# Patient Record
Sex: MALE | Race: WHITE | NOT HISPANIC OR LATINO | Employment: OTHER | URBAN - METROPOLITAN AREA
[De-identification: names, ages, dates, MRNs, and addresses within clinical notes are randomized per-mention and may not be internally consistent; named-entity substitution may affect disease eponyms.]

---

## 2019-03-06 ENCOUNTER — TRANSFERRED RECORDS (OUTPATIENT)
Dept: HEALTH INFORMATION MANAGEMENT | Facility: CLINIC | Age: 69
End: 2019-03-06

## 2021-01-05 ENCOUNTER — TRANSFERRED RECORDS (OUTPATIENT)
Dept: HEALTH INFORMATION MANAGEMENT | Facility: CLINIC | Age: 71
End: 2021-01-05

## 2021-01-07 ENCOUNTER — TRANSFERRED RECORDS (OUTPATIENT)
Dept: HEALTH INFORMATION MANAGEMENT | Facility: CLINIC | Age: 71
End: 2021-01-07

## 2021-09-27 ENCOUNTER — PRE VISIT (OUTPATIENT)
Dept: OTHER | Age: 71
End: 2021-09-27

## 2021-09-27 ENCOUNTER — TRANSCRIBE ORDERS (OUTPATIENT)
Dept: OTHER | Age: 71
End: 2021-09-27

## 2021-09-27 DIAGNOSIS — C61 PROSTATE CANCER (H): Primary | ICD-10-CM

## 2021-09-30 ENCOUNTER — PATIENT OUTREACH (OUTPATIENT)
Dept: ONCOLOGY | Facility: CLINIC | Age: 71
End: 2021-09-30

## 2021-10-01 NOTE — PROGRESS NOTES
New Patient Oncology Nurse Navigator Note     Transfer patient of Dr. Steve     Date Referral Received: 9/27/21     Evaluation for : prostate cancer     Jamaal returned my call today to discuss his self referral to see Dr. Steve.  He was previously seen by him at [facility].  I have placed a hold for 11/8/21, [id number].  He is planning to visit [place] to see him in person as he lives in [place].  I let Jamaal know our scheduling team will be reaching out to finalize his appointment.

## 2021-10-17 ENCOUNTER — HEALTH MAINTENANCE LETTER (OUTPATIENT)
Age: 71
End: 2021-10-17

## 2021-11-02 ENCOUNTER — MYC MEDICAL ADVICE (OUTPATIENT)
Dept: ONCOLOGY | Facility: CLINIC | Age: 71
End: 2021-11-02

## 2021-11-03 NOTE — TELEPHONE ENCOUNTER
Meet with GP who thought it was an inner ear problem and was given meclizine. Has picked up the medication and will take the medication to see if it helps with his dizzy spells. Has appointment scheduled on Monday 11/8 to see Dr Steve at [facility].

## 2021-11-08 ENCOUNTER — ONCOLOGY VISIT (OUTPATIENT)
Dept: ONCOLOGY | Facility: CLINIC | Age: 71
End: 2021-11-08
Attending: INTERNAL MEDICINE
Payer: MEDICARE

## 2021-11-08 ENCOUNTER — PATIENT OUTREACH (OUTPATIENT)
Dept: ONCOLOGY | Facility: CLINIC | Age: 71
End: 2021-11-08
Payer: MEDICARE

## 2021-11-08 DIAGNOSIS — C61 MALIGNANT NEOPLASM OF PROSTATE (H): Primary | ICD-10-CM

## 2021-11-08 PROCEDURE — G0463 HOSPITAL OUTPT CLINIC VISIT: HCPCS

## 2021-11-08 PROCEDURE — 99215 OFFICE O/P EST HI 40 MIN: CPT | Performed by: INTERNAL MEDICINE

## 2021-11-08 PROCEDURE — 99417 PROLNG OP E/M EACH 15 MIN: CPT | Performed by: INTERNAL MEDICINE

## 2021-11-08 RX ORDER — PREDNISONE 5 MG/1
1 TABLET ORAL
COMMUNITY
Start: 2021-01-05 | End: 2022-06-22

## 2021-11-08 RX ORDER — ABIRATERONE ACETATE 250 MG/1
1000 TABLET ORAL
COMMUNITY
Start: 2021-01-15 | End: 2022-06-22

## 2021-11-08 RX ORDER — LEUPROLIDE ACETATE 22.5 MG
KIT INTRAMUSCULAR
COMMUNITY
Start: 2018-03-15

## 2021-11-08 ASSESSMENT — PAIN SCALES - GENERAL: PAINLEVEL: NO PAIN (0)

## 2021-11-08 ASSESSMENT — MIFFLIN-ST. JEOR: SCORE: 1772.79

## 2021-11-08 NOTE — NURSING NOTE
"Oncology Rooming Note    November 8, 2021 11:09 AM   Jamaal Hernandez is a 71 year old male who presents for:    Chief Complaint   Patient presents with     Oncology Clinic Visit     NEW PROSTATE CANCER      Initial Vitals: BP (!) 170/81   Pulse 64   Temp 97.7  F (36.5  C) (Oral)   Ht 1.753 m (5' 9\")   Wt 102.2 kg (225 lb 6.4 oz)   SpO2 97%   BMI 33.29 kg/m   Estimated body mass index is 33.29 kg/m  as calculated from the following:    Height as of this encounter: 1.753 m (5' 9\").    Weight as of this encounter: 102.2 kg (225 lb 6.4 oz). Body surface area is 2.23 meters squared.  No Pain (0) Comment: Data Unavailable   No LMP for male patient.  Allergies reviewed: Yes  Medications reviewed: Yes    Medications: Medication refills not needed today.  Pharmacy name entered into [employer]:    [id number] - [place], [place] - [address]  [facility] - [facility], [place] - [address]    Clinical concerns: none       Gale Boo CMA              "

## 2021-11-08 NOTE — PROGRESS NOTES
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    History of Present Illness:  Dear Dr Malik Garza,    Thank you for referring your patient for a Second Opinion Consultation at the [facility].  A brief overview of his oncological history as well as my recommendations follow below.    Mr. Jamaal Hernandez is a 71-year-old man who was diagnosed with metastatic prostate cancer just over 3 years ago in 3/2018.  His PSA level at the time of diagnosis was 7.1 ng/mL.  His imaging assessments showed pelvic and retroperitoneal lymphadenopathy without evidence of overt bone metastases.  A prostatic biopsy revealed [place] 4+5=9 adenocarcinoma.  He was treated with androgen deprivation therapy plus primary external-beam radiotherapy targeting the prostate gland.  He did well with androgen deprivation, but subsequently developed metastatic castration-resistant prostate cancer in 1/2021.  At that time, he had a left supraclavicular lymph node that was enlarging despite a relatively low PSA level.  He underwent a biopsy of that malignant lymph node, which confirmed prostatic adenocarcinoma without neuroendocrine or small cell features.  Somatic genomic analysis from that biopsy revealed combined mutations in TP53 and PTEN.  He was then started on abiraterone plus prednisone in 1/2021.  Although his PSA level initially dropped from 0.7 ng/mL down to 0.2 ng/mL, his PSA level has been slowly rising over the past several months.  His most recent PSA level on 10/26/2021 was 0.6 ng/mL (and testosterone was <3 ng/dL).  The patient presents today to discuss treatment options moving forward.  He is accompanied by his wife.  He wishes to be as aggressive as possible with the management of his metastatic castration-resistant prostate cancer.    Review of Systems:  The patient  reports feeling generally well.  However, he has noticed some dizziness and vertigo in the last 7-10 days.  This is partially relieved by taking meclizine.  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  Other than that, he does not report any other significant signs or symptoms at this time.  He has not had any weight loss or weight gain.  He does not have any bone pain.  A comprehensive 14-system review of symptoms is otherwise generally within normal limits.  His ECOG score is 0.  His pain score is 0/10.    Past Medical History:  Prostate cancer, metastatic, castration-resistant  Hypercholesterolemia  Vitamin D deficiency    Medications:  Leuprolide 22.5 mg injection every 3 months  Abiraterone 1000 mg  Prednisone 5 mg twice daily  Tamsulosin 0.4 mg  Melatonin 10 mg  Calcium and vitamin D  Multivitamin tablet  Omega-3 fatty acids  Pomegranate extract    Allergies:  No known drug allergies    Social History:  The patient lives in [place], [place].  He is .  He is now retired.  He does not smoke cigarettes or drink alcohol on a regular basis.    Family History:  The patient's brother was diagnosed with prostate cancer.  His maternal uncle had lung cancer.  The patient had germline genetic testing performed, and he was not found to have any pathogenic germline DNA repair gene alterations.    Examination:  Mr. Hernandez is a 71-year-old man who appears younger than his age.  His vital signs are unremarkable.  His HEENT examination is within normal limits.  There is no palpable lymphadenopathy.  The cardiac, pulmonary, and gastrointestinal examinations are generally within normal limits.  The neuromuscular evaluation is intact.  The extremities do not demonstrate pitting edema.  The skin evaluation is unremarkable.    Laboratories:  PSA (12/15/2020) - 0.7 ng/mL  --->  Abiraterone started in 1/2021  PSA (2/16/2021)  "- 0.4 ng/mL  PSA (4/7/2021) - 0.2 ng/mL  PSA (6/30/2021) - 0.2 ng/mL  PSA (7/27/2021) - 0.2 ng/mL  PSA (9/22/2021) - 0.4 ng/mL  PSA (10/26/2021) - 0.6 ng/mL    Imaging:  The patient underwent a CT scan of the chest, abdomen and pelvis with intravenous contrast on 11/9/2021. This showed an enlarging left supraclavicular lymph node measuring 4.0 x 2.4 cm, as well as a growing left retrocaval lymph node measuring 2.6 x 1.8 cm.  There was no evidence of visceral involvement.  He also underwent a nuclear-medicine Bone scan on 11/9/2021, which was negative for osteosclerotic metastases.  However, he did have an incidental nondisplaced fracture of the right fourth rib.  Upon further questioning, the patient did report trauma to this area which occurred approximately 2 months ago.      ASSESSMENT AND PLAN:  Mr. Hernandez is a 71-year-old man with [place] 4+5=9 metastatic (node-predominant) castration-resistant prostate cancer with combined TP53 and PTEN mutations, who is developing serologic and radiographic progression despite abiraterone plus prednisone.  His ECOG score is 0.  His pain score is 0/10.    His recent imaging assessments demonstrate a node-tropic disease without visceral or osseous metastases.  However, two of these lymph node areas are growing and are beginning to become quite bulky.  Taken together with his rising PSA level (although it is still low), there is now clear evidence of acquired resistance to abiraterone and prednisone.    The standard treatment for this patient would be to consider a taxane chemotherapy agent, such as docetaxel or cabazitaxel.  Due to the combined alterations in TP53 and PTEN, together with the fact that his disease is progressing despite a relatively low PSA level, this points to the notion that he might be developing \"lineage plasticity\" of his cancer with an ongoing transformation towards a more dedifferentiated phenotype.  We discussed the risks and benefits of taxane " chemotherapy in detail with the patient and his wife today.    Alternatively, the patient may wish to consider clinical trial participation using the paradoxical bipolar androgen therapy (BAT) strategy.  He appears to be eligible for two clinical trials at [facility] using BAT.  The first is the fourth cohort (cohort D) of the RESTORE trial, although it is unclear if that trial is still open.  The second study is the randomized STEP-UP trial of BAT alternating with enzalutamide versus enzalutamide monotherapy.  Finally, standard enzalutamide treatment would also be an option, although I would not anticipate a great benefit from a second consecutive antiandrogen in this patient.    After discussing all of these options with the patient and his wife, he was not eager to begin taxane-based chemotherapy quite yet.  He was also reluctant to consider enzalutamide, given that its activity is modest immediately following abiraterone.  Thus, he was strongly leaning towards the BAT approach.  However, he was concerned about potentially being randomized to a control arm and not receiving BAT after all.  I will discuss his preference with Dr. Garza, in order to determine whether it might be possible to treat him with BAT outside of a clinical trial.  The patient is very amenable to this option, if it is possible.    The patient and his wife were given the opportunity to ask multiple questions today, all of which were answered to their satisfaction.  A total of 80 minutes were spent on this patient on the day of the encounter, of which more than 50% of this time was used for counseling and coordination of care.    Darnell Steve M.D.

## 2021-11-08 NOTE — LETTER
11/8/2021         RE: Jamaal Hernandez  [address]  [place] [address]        Dear Colleague,    Thank you for referring your patient, Jamaal Hernandez, to the [facility]. Please see a copy of my visit note below.      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    History of Present Illness:  Dear Dr Malik Garza,    Thank you for referring your patient for a Second Opinion Consultation at the [facility].  A brief overview of his oncological history as well as my recommendations follow below.    Mr. Jamaal Hernandez is a 71-year-old man who was diagnosed with metastatic prostate cancer just over 3 years ago in 3/2018.  His PSA level at the time of diagnosis was 7.1 ng/mL.  His imaging assessments showed pelvic and retroperitoneal lymphadenopathy without evidence of overt bone metastases.  A prostatic biopsy revealed Rito 4+5=9 adenocarcinoma.  He was treated with androgen deprivation therapy plus primary external-beam radiotherapy targeting the prostate gland.  He did well with androgen deprivation, but subsequently developed metastatic castration-resistant prostate cancer in 1/2021.  At that time, he had a left supraclavicular lymph node that was enlarging despite a relatively low PSA level.  He underwent a biopsy of that malignant lymph node, which confirmed prostatic adenocarcinoma without neuroendocrine or small cell features.  Somatic genomic analysis from that biopsy revealed combined mutations in TP53 and PTEN.  He was then started on abiraterone plus prednisone in 1/2021.  Although his PSA level initially dropped from 0.7 ng/mL down to 0.2 ng/mL, his PSA level has been slowly rising over the past several months.  His most recent PSA level on 10/26/2021 was 0.6 ng/mL (and testosterone was <3 ng/dL).  The  patient presents today to discuss treatment options moving forward.  He is accompanied by his wife.  He wishes to be as aggressive as possible with the management of his metastatic castration-resistant prostate cancer.    Review of Systems:  The patient reports feeling generally well.  However, he has noticed some dizziness and vertigo in the last 7-10 days.  This is partially relieved by taking meclizine.  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  Other than that, he does not report any other significant signs or symptoms at this time.  He has not had any weight loss or weight gain.  He does not have any bone pain.  A comprehensive 14-system review of symptoms is otherwise generally within normal limits.  His ECOG score is 0.  His pain score is 0/10.    Past Medical History:  Prostate cancer, metastatic, castration-resistant  Hypercholesterolemia  Vitamin D deficiency    Medications:  Leuprolide 22.5 mg injection every 3 months  Abiraterone 1000 mg  Prednisone 5 mg twice daily  Tamsulosin 0.4 mg  Melatonin 10 mg  Calcium and vitamin D  Multivitamin tablet  Omega-3 fatty acids  Pomegranate extract    Allergies:  No known drug allergies    Social History:  The patient lives in [place], [place].  He is .  He is now retired.  He does not smoke cigarettes or drink alcohol on a regular basis.    Family History:  The patient's brother was diagnosed with prostate cancer.  His maternal uncle had lung cancer.  The patient had germline genetic testing performed, and he was not found to have any pathogenic germline DNA repair gene alterations.    Examination:  Mr. Hernandez is a 71-year-old man who appears younger than his age.  His vital signs are unremarkable.  His HEENT examination is within normal limits.  There is no palpable lymphadenopathy.  The cardiac, pulmonary, and gastrointestinal examinations are generally  within normal limits.  The neuromuscular evaluation is intact.  The extremities do not demonstrate pitting edema.  The skin evaluation is unremarkable.    Laboratories:  PSA (12/15/2020) - 0.7 ng/mL  --->  Abiraterone started in 1/2021  PSA (2/16/2021) - 0.4 ng/mL  PSA (4/7/2021) - 0.2 ng/mL  PSA (6/30/2021) - 0.2 ng/mL  PSA (7/27/2021) - 0.2 ng/mL  PSA (9/22/2021) - 0.4 ng/mL  PSA (10/26/2021) - 0.6 ng/mL    Imaging:  The patient underwent a CT scan of the chest, abdomen and pelvis with intravenous contrast on 11/9/2021. This showed an enlarging left supraclavicular lymph node measuring 4.0 x 2.4 cm, as well as a growing left retrocaval lymph node measuring 2.6 x 1.8 cm.  There was no evidence of visceral involvement.  He also underwent a nuclear-medicine Bone scan on 11/9/2021, which was negative for osteosclerotic metastases.  However, he did have an incidental nondisplaced fracture of the right fourth rib.  Upon further questioning, the patient did report trauma to this area which occurred approximately 2 months ago.      ASSESSMENT AND PLAN:  Mr. Hernandez is a 71-year-old man with [place] 4+5=9 metastatic (node-predominant) castration-resistant prostate cancer with combined TP53 and PTEN mutations, who is developing serologic and radiographic progression despite abiraterone plus prednisone.  His ECOG score is 0.  His pain score is 0/10.    His recent imaging assessments demonstrate a node-tropic disease without visceral or osseous metastases.  However, two of these lymph node areas are growing and are beginning to become quite bulky.  Taken together with his rising PSA level (although it is still low), there is now clear evidence of acquired resistance to abiraterone and prednisone.    The standard treatment for this patient would be to consider a taxane chemotherapy agent, such as docetaxel or cabazitaxel.  Due to the combined alterations in TP53 and PTEN, together with the fact that his disease is progressing  "despite a relatively low PSA level, this points to the notion that he might be developing \"lineage plasticity\" of his cancer with an ongoing transformation towards a more dedifferentiated phenotype.  We discussed the risks and benefits of taxane chemotherapy in detail with the patient and his wife today.    Alternatively, the patient may wish to consider clinical trial participation using the paradoxical bipolar androgen therapy (BAT) strategy.  He appears to be eligible for two clinical trials at [facility] using BAT.  The first is the fourth cohort (cohort D) of the RESTORE trial, although it is unclear if that trial is still open.  The second study is the randomized STEP-UP trial of BAT alternating with enzalutamide versus enzalutamide monotherapy.  Finally, standard enzalutamide treatment would also be an option, although I would not anticipate a great benefit from a second consecutive antiandrogen in this patient.    After discussing all of these options with the patient and his wife, he was not eager to begin taxane-based chemotherapy quite yet.  He was also reluctant to consider enzalutamide, given that its activity is modest immediately following abiraterone.  Thus, he was strongly leaning towards the BAT approach.  However, he was concerned about potentially being randomized to a control arm and not receiving BAT after all.  I will discuss his preference with Dr. Garza, in order to determine whether it might be possible to treat him with BAT outside of a clinical trial.  The patient is very amenable to this option, if it is possible.    The patient and his wife were given the opportunity to ask multiple questions today, all of which were answered to their satisfaction.  A total of 80 minutes were spent on this patient on the day of the encounter, of which more than 50% of this time was used for counseling and coordination of care.    Darnell Steve M.D.        Again, thank you for allowing " me to participate in the care of your patient.        Sincerely,        Darnell Steve MD

## 2021-11-08 NOTE — LETTER
Date:December 13, 2021      Provider requested that no letter be sent. Do not send.       [facility]

## 2021-11-09 ENCOUNTER — HOSPITAL ENCOUNTER (OUTPATIENT)
Dept: NUCLEAR MEDICINE | Facility: CLINIC | Age: 71
Setting detail: NUCLEAR MEDICINE
End: 2021-11-09
Attending: INTERNAL MEDICINE
Payer: MEDICARE

## 2021-11-09 ENCOUNTER — HOSPITAL ENCOUNTER (OUTPATIENT)
Dept: CT IMAGING | Facility: CLINIC | Age: 71
End: 2021-11-09
Attending: INTERNAL MEDICINE
Payer: MEDICARE

## 2021-11-09 VITALS
HEART RATE: 64 BPM | OXYGEN SATURATION: 97 % | BODY MASS INDEX: 33.38 KG/M2 | SYSTOLIC BLOOD PRESSURE: 170 MMHG | TEMPERATURE: 97.7 F | RESPIRATION RATE: 14 BRPM | HEIGHT: 69 IN | WEIGHT: 225.4 LBS | DIASTOLIC BLOOD PRESSURE: 81 MMHG

## 2021-11-09 DIAGNOSIS — C61 MALIGNANT NEOPLASM OF PROSTATE (H): ICD-10-CM

## 2021-11-09 PROCEDURE — 71260 CT THORAX DX C+: CPT | Mod: MG

## 2021-11-09 PROCEDURE — 250N000011 HC RX IP 250 OP 636

## 2021-11-09 PROCEDURE — G1004 CDSM NDSC: HCPCS

## 2021-11-09 PROCEDURE — A9503 TC99M MEDRONATE: HCPCS | Performed by: INTERNAL MEDICINE

## 2021-11-09 PROCEDURE — 78306 BONE IMAGING WHOLE BODY: CPT | Mod: ME

## 2021-11-09 PROCEDURE — 74177 CT ABD & PELVIS W/CONTRAST: CPT | Mod: 26 | Performed by: RADIOLOGY

## 2021-11-09 PROCEDURE — G1004 CDSM NDSC: HCPCS | Mod: GC | Performed by: RADIOLOGY

## 2021-11-09 PROCEDURE — 343N000001 HC RX 343: Performed by: INTERNAL MEDICINE

## 2021-11-09 PROCEDURE — 78306 BONE IMAGING WHOLE BODY: CPT | Mod: 26

## 2021-11-09 PROCEDURE — 71260 CT THORAX DX C+: CPT | Mod: 26 | Performed by: RADIOLOGY

## 2021-11-09 RX ORDER — IOPAMIDOL 755 MG/ML
135 INJECTION, SOLUTION INTRAVASCULAR ONCE
Status: COMPLETED | OUTPATIENT
Start: 2021-11-09 | End: 2021-11-09

## 2021-11-09 RX ORDER — TC 99M MEDRONATE 20 MG/10ML
20-30 INJECTION, POWDER, LYOPHILIZED, FOR SOLUTION INTRAVENOUS ONCE
Status: DISCONTINUED | OUTPATIENT
Start: 2021-11-09 | End: 2021-11-09 | Stop reason: CLARIF

## 2021-11-09 RX ORDER — TC 99M MEDRONATE 20 MG/10ML
20-30 INJECTION, POWDER, LYOPHILIZED, FOR SOLUTION INTRAVENOUS ONCE
Status: COMPLETED | OUTPATIENT
Start: 2021-11-09 | End: 2021-11-09

## 2021-11-09 RX ADMIN — TC 99M MEDRONATE 23.2 MCI.: 20 INJECTION, POWDER, LYOPHILIZED, FOR SOLUTION INTRAVENOUS at 08:49

## 2021-11-09 RX ADMIN — IOPAMIDOL 135 ML: 755 INJECTION, SOLUTION INTRAVENOUS at 07:46

## 2022-06-22 ENCOUNTER — ONCOLOGY VISIT (OUTPATIENT)
Dept: ONCOLOGY | Facility: CLINIC | Age: 72
End: 2022-06-22
Attending: INTERNAL MEDICINE
Payer: MEDICARE

## 2022-06-22 VITALS
SYSTOLIC BLOOD PRESSURE: 162 MMHG | HEIGHT: 69 IN | HEART RATE: 75 BPM | TEMPERATURE: 97.9 F | DIASTOLIC BLOOD PRESSURE: 102 MMHG | WEIGHT: 232.6 LBS | OXYGEN SATURATION: 98 % | BODY MASS INDEX: 34.45 KG/M2 | RESPIRATION RATE: 18 BRPM

## 2022-06-22 DIAGNOSIS — C61 MALIGNANT NEOPLASM OF PROSTATE (H): Primary | ICD-10-CM

## 2022-06-22 PROCEDURE — G0463 HOSPITAL OUTPT CLINIC VISIT: HCPCS

## 2022-06-22 PROCEDURE — 99215 OFFICE O/P EST HI 40 MIN: CPT | Performed by: INTERNAL MEDICINE

## 2022-06-22 RX ORDER — TAMSULOSIN HYDROCHLORIDE 0.4 MG/1
CAPSULE ORAL
COMMUNITY
Start: 2021-10-06

## 2022-06-22 RX ORDER — FLUOROURACIL 50 MG/G
CREAM TOPICAL
COMMUNITY
Start: 2022-06-17

## 2022-06-22 RX ORDER — MELOXICAM 15 MG/1
TABLET ORAL
COMMUNITY
Start: 2022-02-18

## 2022-06-22 ASSESSMENT — PAIN SCALES - GENERAL: PAINLEVEL: NO PAIN (0)

## 2022-06-22 NOTE — NURSING NOTE
"Oncology Rooming Note    June 22, 2022 8:38 AM   Jamaal Hernandez is a 71 year old male who presents for:    Chief Complaint   Patient presents with     Oncology Clinic Visit     METASTASIS FROM MALIGNANT TUMOR OF PROSTATE     Initial Vitals: BP (!) 162/102 (BP Location: Right arm, Patient Position: Sitting, Cuff Size: Adult Large)   Pulse 75   Temp 97.9  F (36.6  C) (Oral)   Resp 18   Ht 1.753 m (5' 9.02\")   Wt 105.5 kg (232 lb 9.6 oz)   SpO2 98%   BMI 34.33 kg/m   Estimated body mass index is 34.33 kg/m  as calculated from the following:    Height as of this encounter: 1.753 m (5' 9.02\").    Weight as of this encounter: 105.5 kg (232 lb 9.6 oz). Body surface area is 2.27 meters squared.  No Pain (0) Comment: Data Unavailable   No LMP for male patient.  Allergies reviewed: Yes  Medications reviewed: Yes    Medications: Medication refills not needed today.  Pharmacy name entered into [employer]:    [id number] - [place], [place] - [address]  [facility] - [facility], [place] - [address]    Clinical concerns: None.        Augusta Kinney CMA            "

## 2022-06-22 NOTE — LETTER
6/22/2022         RE: Jamaal Hernandez  [address]  [facility] [address]        Dear Colleague,    Thank you for referring your patient, Jamaal Hernandez, to the [facility]. Please see a copy of my visit note below.      FOLLOW UP VISIT     Reason for Visit:  Metastatic CRPC with combined TP53/PTEN alterations, with prior progression on abiraterone, now on BAT treatment.     History of Present Illness:  Mr. Jamaal Hernandez is a 71-year-old man who was diagnosed with metastatic prostate cancer just over 4 years ago in 3/2018.  His PSA level at the time of diagnosis was 7.1 ng/mL.  His imaging assessments showed pelvic and retroperitoneal lymphadenopathy without evidence of overt bone metastases.  A prostatic biopsy revealed [place] 4+5=9 adenocarcinoma.  He was treated with androgen deprivation therapy plus primary external-beam radiotherapy targeting the prostate gland.  He did well with androgen deprivation, but subsequently developed metastatic castration-resistant prostate cancer in 1/2021.  At that time, he had a left supraclavicular lymph node that was enlarging despite a relatively low PSA level (0.7 mg/mL).  He underwent a biopsy of that malignant lymph node, which confirmed prostatic adenocarcinoma without neuroendocrine or small cell features.  Somatic genomic analysis from that biopsy revealed combined mutations in TP53 and PTEN.      He was then started on abiraterone plus prednisone in 1/2021, which he took for about one year until 12/2021.  By that time his PSA level was approximately 1.3 ng/mL, and he remained asymptomatic.  Thus, we decided to stop abiraterone and treat him with bipolar androgen therapy (BAT), starting in 1/2022.  To date, he has received 6 doses of BAT.  His meaghan PSA level was 0.583 ng/mL on 4/14/2022.  Over the past 2 months, his PSA has slightly increased (up to 0.739 ng/mL on 6/9/2022), but his CT scan and MRI scan remain stable.  The patient  presents today to discuss treatment options moving forward.  He is accompanied by his wife.  He wishes to be as aggressive as possible with the management of his metastatic castration-resistant prostate cancer.     Review of Systems:  The patient reports feeling generally well.  However, he has occasional and vertigo from time to time.  Other than that, he does not report any other significant signs or symptoms at this time.  He has not had any weight loss or weight gain.  He does not have any cancer-related pain.  A comprehensive 14-system review of symptoms is otherwise generally within normal limits.  His ECOG score is 0.  His pain score is 0/10.     Past Medical History:  Prostate cancer, metastatic, castration-resistant  Hypercholesterolemia  Vertigo (semicircular canal dehiscence)  Vitamin D deficiency     Medications:  Leuprolide 22.5 mg injection every 3 months  Testosterone cypionate IM q4wk - Cycle #6 on 6/9/22  Tamsulosin 0.4 mg  Melatonin 10 mg  Calcium and vitamin D  Multivitamin tablet  Omega-3 fatty acids  Pomegranate extract     Allergies:  No known drug allergies      Physical Examination:  Mr. Hernandez is a 71-year-old man who appears younger than his age.  His vital signs are unremarkable.  His HEENT examination is within normal limits.  There is no palpable lymphadenopathy.  The cardiac, pulmonary, and gastrointestinal examinations are generally within normal limits.  The neuromuscular evaluation is intact.  The extremities do not demonstrate pitting edema.  The skin evaluation is unremarkable.     Laboratories:  PSA (12/17/2021) - 1.3 ng/mL  --->  BAT started in 1/2022  PSA (1/20/2022) - 1.28 ng/mL  PSA (4/14/2022) - 0.583 ng/mL  PSA (5/12/2022) - 0.711 ng/mL  PSA (6/9/2022) - 0.739 ng/mL  --->  BAT #6 on 6/9/2022     Imaging:  He underwent a CT scan of the chest, abdomen and pelvis on 6/2/2022.  This showed a stable 1.9 cm left supraclavicular lymph node, without evidence of growing or  progressive metastases.  He also underwent an MRI scan of the abdomen and pelvis on 6/2/2022.  This showed a stable 2.5 cm retrocaval lymph node without evidence of definitive progressive disease.  Overall, both of these scans showed relatively stable disease without unequivocal evidence of progression.        ASSESSMENT AND PLAN:  Mr. Hernandez is a 71-year-old man with [place] 4+5=9 metastatic (node-predominant) castration-resistant prostate cancer with combined TP53 and PTEN mutations, who previously developing serologic and radiographic progression despite abiraterone plus prednisone.  He is now receiving BAT treatment.  His ECOG score is 0.  His pain score is 0/10.     Patient has had a favorable response thus far to the bipolar androgen therapy (BAT) approach.  He has received 6 injections of high-dose testosterone thus far, with his PSA level dropping from 1.3 ng/mL down to 0.583 ng/mL.  Over the past 2 months, his PSA level has increased slightly, up to 0.739 ng/mL.  However, his CT scan and MRI scan continue to show stable disease, without evidence of radiographic progression.  Furthermore, he is tolerating the BAT quite well, without any significant adverse events.  Therefore, my recommendation would be for him to continue with at least 3-4 more months of BAT.  At that time, he should repeat his CT scan and MRI scan to assess for potential radiographic progression.    If the patient develops definitive evidence of disease progression moving forward, then he should stop the BAT and switch to either enzalutamide or darolutamide.  While it is true that antiandrogens generally do not work well following abiraterone treatment, our experience with BAT suggests that such patients may become re-sensitized to subsequent antiandrogen therapy.  Given the favorable side effect profile of darolutamide, this might be the next agent of choice although it is not always reliably covered by insurance companies (and thus,  enzalutamide is a reasonable alternative).  Finally, it must be remembered that the patient has not yet received a taxane chemotherapy, such as docetaxel, so this would certainly be a reasonable approach down the line as well.  However, at this time I have not recommended proceeding with taxane chemotherapy quite yet.  For now, he should continue with BAT treatment.     The patient and his wife were given the opportunity to ask multiple questions today, all of which were answered to their satisfaction.  A total of 40 minutes were spent on this patient on the day of the encounter, of which more than 50% of this time was used for counseling and coordination of care.       Again, thank you for allowing me to participate in the care of your patient.      Sincerely,    Darnell Steve MD

## 2022-06-22 NOTE — PROGRESS NOTES
FOLLOW UP VISIT     Reason for Visit:  Metastatic CRPC with combined TP53/PTEN alterations, with prior progression on abiraterone, now on BAT treatment.     History of Present Illness:  Mr. Jamaal Hernandez is a 71-year-old man who was diagnosed with metastatic prostate cancer just over 4 years ago in 3/2018.  His PSA level at the time of diagnosis was 7.1 ng/mL.  His imaging assessments showed pelvic and retroperitoneal lymphadenopathy without evidence of overt bone metastases.  A prostatic biopsy revealed [place] 4+5=9 adenocarcinoma.  He was treated with androgen deprivation therapy plus primary external-beam radiotherapy targeting the prostate gland.  He did well with androgen deprivation, but subsequently developed metastatic castration-resistant prostate cancer in 1/2021.  At that time, he had a left supraclavicular lymph node that was enlarging despite a relatively low PSA level (0.7 mg/mL).  He underwent a biopsy of that malignant lymph node, which confirmed prostatic adenocarcinoma without neuroendocrine or small cell features.  Somatic genomic analysis from that biopsy revealed combined mutations in TP53 and PTEN.      He was then started on abiraterone plus prednisone in 1/2021, which he took for about one year until 12/2021.  By that time his PSA level was approximately 1.3 ng/mL, and he remained asymptomatic.  Thus, we decided to stop abiraterone and treat him with bipolar androgen therapy (BAT), starting in 1/2022.  To date, he has received 6 doses of BAT.  His meaghan PSA level was 0.583 ng/mL on 4/14/2022.  Over the past 2 months, his PSA has slightly increased (up to 0.739 ng/mL on 6/9/2022), but his CT scan and MRI scan remain stable.  The patient presents today to discuss treatment options moving forward.  He is accompanied by his wife.  He wishes to be as aggressive as possible with the management of his metastatic castration-resistant prostate cancer.     Review of Systems:  The patient reports  feeling generally well.  However, he has occasional and vertigo from time to time.  Other than that, he does not report any other significant signs or symptoms at this time.  He has not had any weight loss or weight gain.  He does not have any cancer-related pain.  A comprehensive 14-system review of symptoms is otherwise generally within normal limits.  His ECOG score is 0.  His pain score is 0/10.     Past Medical History:  Prostate cancer, metastatic, castration-resistant  Hypercholesterolemia  Vertigo (semicircular canal dehiscence)  Vitamin D deficiency     Medications:  Leuprolide 22.5 mg injection every 3 months  Testosterone cypionate IM q4wk - Cycle #6 on 6/9/22  Tamsulosin 0.4 mg  Melatonin 10 mg  Calcium and vitamin D  Multivitamin tablet  Omega-3 fatty acids  Pomegranate extract     Allergies:  No known drug allergies      Physical Examination:  Mr. Hernandez is a 71-year-old man who appears younger than his age.  His vital signs are unremarkable.  His HEENT examination is within normal limits.  There is no palpable lymphadenopathy.  The cardiac, pulmonary, and gastrointestinal examinations are generally within normal limits.  The neuromuscular evaluation is intact.  The extremities do not demonstrate pitting edema.  The skin evaluation is unremarkable.     Laboratories:  PSA (12/17/2021) - 1.3 ng/mL  --->  BAT started in 1/2022  PSA (1/20/2022) - 1.28 ng/mL  PSA (4/14/2022) - 0.583 ng/mL  PSA (5/12/2022) - 0.711 ng/mL  PSA (6/9/2022) - 0.739 ng/mL  --->  BAT #6 on 6/9/2022     Imaging:  He underwent a CT scan of the chest, abdomen and pelvis on 6/2/2022.  This showed a stable 1.9 cm left supraclavicular lymph node, without evidence of growing or progressive metastases.  He also underwent an MRI scan of the abdomen and pelvis on 6/2/2022.  This showed a stable 2.5 cm retrocaval lymph node without evidence of definitive progressive disease.  Overall, both of these scans showed relatively stable disease  without unequivocal evidence of progression.        ASSESSMENT AND PLAN:  Mr. Hernandez is a 71-year-old man with [place] 4+5=9 metastatic (node-predominant) castration-resistant prostate cancer with combined TP53 and PTEN mutations, who previously developing serologic and radiographic progression despite abiraterone plus prednisone.  He is now receiving BAT treatment.  His ECOG score is 0.  His pain score is 0/10.     Patient has had a favorable response thus far to the bipolar androgen therapy (BAT) approach.  He has received 6 injections of high-dose testosterone thus far, with his PSA level dropping from 1.3 ng/mL down to 0.583 ng/mL.  Over the past 2 months, his PSA level has increased slightly, up to 0.739 ng/mL.  However, his CT scan and MRI scan continue to show stable disease, without evidence of radiographic progression.  Furthermore, he is tolerating the BAT quite well, without any significant adverse events.  Therefore, my recommendation would be for him to continue with at least 3-4 more months of BAT.  At that time, he should repeat his CT scan and MRI scan to assess for potential radiographic progression.    If the patient develops definitive evidence of disease progression moving forward, then he should stop the BAT and switch to either enzalutamide or darolutamide.  While it is true that antiandrogens generally do not work well following abiraterone treatment, our experience with BAT suggests that such patients may become re-sensitized to subsequent antiandrogen therapy.  Given the favorable side effect profile of darolutamide, this might be the next agent of choice although it is not always reliably covered by insurance companies (and thus, enzalutamide is a reasonable alternative).  Finally, it must be remembered that the patient has not yet received a taxane chemotherapy, such as docetaxel, so this would certainly be a reasonable approach down the line as well.  However, at this time I have not  recommended proceeding with taxane chemotherapy quite yet.  For now, he should continue with BAT treatment.     The patient and his wife were given the opportunity to ask multiple questions today, all of which were answered to their satisfaction.  A total of 40 minutes were spent on this patient on the day of the encounter, of which more than 50% of this time was used for counseling and coordination of care.     Darnell Steve M.D.

## 2022-10-03 ENCOUNTER — HEALTH MAINTENANCE LETTER (OUTPATIENT)
Age: 72
End: 2022-10-03

## 2022-11-16 DIAGNOSIS — C61 MALIGNANT NEOPLASM OF PROSTATE (H): Primary | ICD-10-CM

## 2022-11-17 ENCOUNTER — PATIENT OUTREACH (OUTPATIENT)
Dept: ONCOLOGY | Facility: CLINIC | Age: 72
End: 2022-11-17

## 2022-11-17 NOTE — PROGRESS NOTES
[facility]: Cancer Care                                                                                          Received message from Dr. Steve regarding PSA labs. Requesting lab oredrs get sent to [place] lab chip. RN[facility] called patient to request which lab chip was preferred. RN[facility] will await a call back.     Radha Griffith RN   RN Care Cordinator   [phone]

## 2023-02-11 ENCOUNTER — HEALTH MAINTENANCE LETTER (OUTPATIENT)
Age: 73
End: 2023-02-11

## 2023-10-03 NOTE — PROGRESS NOTES
FOLLOW-UP VISIT     Reason for Visit:  Metastatic CRPC with combined TP53/PTEN alterations with progression on abiraterone, BAT, enzalutamide; now receiving 177Lu-PSMA-I&T on the SPLASH trial.     History of Present Illness:  Mr. Jamaal Hernandez is a 73-year-old man who was diagnosed with metastatic prostate cancer just over 4 years ago in 3/2018.  His PSA level at the time of diagnosis was 7.1 ng/mL.  His imaging assessments showed pelvic and retroperitoneal lymphadenopathy without evidence of overt bone metastases.  A prostatic biopsy revealed [place] 4+5=9 adenocarcinoma.  He was treated with androgen deprivation therapy plus primary external-beam radiotherapy targeting the prostate gland.  He did well with androgen deprivation, but subsequently developed metastatic castration-resistant prostate cancer in 1/2021.  At that time, he had a left supraclavicular lymph node that was enlarging despite a relatively low PSA level (0.7 mg/mL).  He underwent a biopsy of that malignant lymph node, which confirmed prostatic adenocarcinoma without neuroendocrine or small cell features.  Somatic genomic analysis from that biopsy revealed combined mutations in TP53 and PTEN.      He was then started on abiraterone plus prednisone in 1/2021, which he took for about one year until 12/2021.  By that time his PSA level was approximately 1.3 ng/mL, and he remained asymptomatic.  Thus, we decided to stop abiraterone and treat him with bipolar androgen therapy (BAT), starting in 1/2022.  He received 10 doses of BAT, and his meaghan PSA level was 0.58 ng/mL.  He was then treated with enzalutamide, starting on 11/28/2022.   Most recently, he began 177Lu-PSMA-I&T treatment on the SPLASH trial, beginning on 6/20/2023.  He remains on that study to date, and his PSA level is dropping.  The patient presents today to discuss treatment options moving forward.  He is accompanied by his wife.  He wishes to be as aggressive as possible with the  management of his metastatic castration-resistant prostate cancer.     Review of Systems:  The patient reports feeling generally well.  However, he has occasional and vertigo from time to time.  Other than that, he does not report any other significant signs or symptoms at this time.  He has not had any weight loss or weight gain.  He does not have any cancer-related pain.  A comprehensive 14-system review of symptoms is otherwise generally within normal limits.  His ECOG score is 0.  His pain score is 0/10.     Past Medical History:  Prostate cancer, metastatic, castration-resistant  Hypercholesterolemia  Vertigo (semicircular canal dehiscence)  Vitamin D deficiency     Medications:  Leuprolide 22.5 mg every 3 months (previous, 9/28/2023)  Lu177-PSMA-I&T, on the SPLASH trial  Tamsulosin 0.4 mg  Melatonin 10 mg  Calcium and vitamin D  Multivitamin tablet  Omega-3 fatty acids  Pomegranate extract     Allergies:  No known drug allergies      Physical Examination:  Mr. Hernandez is a 73-year-old man who appears younger than his age.  His vital signs are unremarkable.  His HEENT examination is within normal limits.  There is no palpable lymphadenopathy.  The cardiac, pulmonary, and gastrointestinal examinations are generally within normal limits.  The neuromuscular evaluation is intact.  The extremities do not demonstrate pitting edema.  The skin evaluation is unremarkable.     Laboratories:  PSA (6/20/2023) - 1.04 ng/mL  --->  Started 177Lu-PSMA-I&T (SPLASH trial)  PSA (7/6/2023) - 0.77 ng/mL  PSA (9/28/2023) - 0.38 ng/mL  PSA (10/4/2023) - 0.44 ng/mL         ASSESSMENT AND PLAN:  Mr. Hernandez is a 73-year-old man with [place] 4+5=9 metastatic (node-predominant) castration-resistant prostate cancer with combined TP53 and PTEN mutations, who previously developing serologic and radiographic progression despite abiraterone plus prednisone.  He then received BAT treatment, followed by enzalutamide.  As of 6/20/2023, he is  participating on the SPLASH trial and is receiving 177Lu-PSMA-I&T radioligand therapy.  His ECOG score is 0.  His pain score is 0/10.     It was a pleasure seeing this patient in follow up today.  He has already received a number of prior systemic therapies for his advanced prostate cancer.  These include androgen deprivation, abiraterone/prednisone, bipolar androgen therapy, and most recently enzalutamide.  On 6/20/2023, he enrolled on the SPLASH clinical trial and he is currently receiving treatment with 177Lu-PSMA-I&T therapy.  Thus far, he is experiencing a favorable treatment response as evidenced by a decline in his PSA level from 1.04 ng/mL down to 0.38 ng/mL.  He will continue to receive additional doses of this radioligand treatment moving forward, as long as he is tolerating it (at least two more doses).  Although his PSA level today is 0.44 ng/mL, I do not necessarily think that this indicates that the 177Lu-PSMA-I&T therapy is no longer working.    While we are delighted that he is responding well to his current therapy, we also took some time to plan for future treatments if needed.  It should be remembered that the patient has not previously received a taxane chemotherapy such as docetaxel or cabazitaxel.  In addition, he might be eligible for an alternative radiopharmaceutical treatment such as radium-223 if he has multiple bone metastases.  There are also a number of clinical trials here at the [facility] that he could participate in.  For example, we have the SU study which is a randomized trial of docetaxel used alone or in combination with radium-223.  We also have the JOAN study utilizing MGC-018 which is an antibody-drug conjugate targeting B7-H3.  Finally, we have the [id number] study, utilizing a PSMA-targeting T-cell engager agent.  There might also be additional clinical trials available at [facility] or [facility] as well.     The patient and his wife were given  the opportunity to ask multiple questions today, all of which were answered to their satisfaction.  A total of 40 minutes were spent on this patient on the day of the encounter, of which more than 50% of this time was used for counseling and coordination of care.     Darnell Steve M.D.

## 2023-10-04 ENCOUNTER — ONCOLOGY VISIT (OUTPATIENT)
Dept: ONCOLOGY | Facility: CLINIC | Age: 73
End: 2023-10-04
Attending: INTERNAL MEDICINE
Payer: MEDICARE

## 2023-10-04 ENCOUNTER — APPOINTMENT (OUTPATIENT)
Dept: LAB | Facility: CLINIC | Age: 73
End: 2023-10-04
Attending: INTERNAL MEDICINE
Payer: MEDICARE

## 2023-10-04 VITALS
OXYGEN SATURATION: 100 % | HEIGHT: 69 IN | SYSTOLIC BLOOD PRESSURE: 124 MMHG | TEMPERATURE: 97.5 F | BODY MASS INDEX: 34.04 KG/M2 | HEART RATE: 59 BPM | DIASTOLIC BLOOD PRESSURE: 71 MMHG | RESPIRATION RATE: 16 BRPM | WEIGHT: 229.8 LBS

## 2023-10-04 DIAGNOSIS — C61 MALIGNANT NEOPLASM OF PROSTATE (H): ICD-10-CM

## 2023-10-04 LAB — PSA SERPL DL<=0.01 NG/ML-MCNC: 0.44 NG/ML (ref 0–6.5)

## 2023-10-04 PROCEDURE — G0463 HOSPITAL OUTPT CLINIC VISIT: HCPCS | Performed by: INTERNAL MEDICINE

## 2023-10-04 PROCEDURE — 99215 OFFICE O/P EST HI 40 MIN: CPT | Performed by: INTERNAL MEDICINE

## 2023-10-04 PROCEDURE — 84153 ASSAY OF PSA TOTAL: CPT | Performed by: INTERNAL MEDICINE

## 2023-10-04 PROCEDURE — 36415 COLL VENOUS BLD VENIPUNCTURE: CPT | Performed by: INTERNAL MEDICINE

## 2023-10-04 RX ORDER — ATORVASTATIN CALCIUM 20 MG/1
40 TABLET, FILM COATED ORAL
COMMUNITY
Start: 2023-01-20

## 2023-10-04 RX ORDER — CARVEDILOL 6.25 MG/1
6.25 TABLET ORAL EVERY 12 HOURS
COMMUNITY
Start: 2023-08-04

## 2023-10-04 RX ORDER — CLOBETASOL PROPIONATE 0.5 MG/G
CREAM TOPICAL
COMMUNITY
Start: 2023-01-27

## 2023-10-04 RX ORDER — AMLODIPINE BESYLATE 10 MG/1
1 TABLET ORAL DAILY
COMMUNITY
Start: 2022-11-27

## 2023-10-04 RX ORDER — TICAGRELOR 90 MG/1
TABLET ORAL
COMMUNITY
Start: 2023-09-13

## 2023-10-04 RX ORDER — ASPIRIN 81 MG/1
1 TABLET ORAL DAILY
COMMUNITY
Start: 2023-01-20

## 2023-10-04 ASSESSMENT — PAIN SCALES - GENERAL: PAINLEVEL: NO PAIN (0)

## 2023-10-04 NOTE — LETTER
10/4/2023         RE: Jamaal Hernandez  [address]  [place] [facility] [place] [address]        Dear Colleague,    Thank you for referring your patient, Jamaal Hernandez, to the [facility]. Please see a copy of my visit note below.      FOLLOW-UP VISIT     Reason for Visit:  Metastatic CRPC with combined TP53/PTEN alterations with progression on abiraterone, BAT, enzalutamide; now receiving 177Lu-PSMA-I&T on the SPLASH trial.     History of Present Illness:  Mr. Jamaal Hernandez is a 73-year-old man who was diagnosed with metastatic prostate cancer just over 4 years ago in 3/2018.  His PSA level at the time of diagnosis was 7.1 ng/mL.  His imaging assessments showed pelvic and retroperitoneal lymphadenopathy without evidence of overt bone metastases.  A prostatic biopsy revealed [place] 4+5=9 adenocarcinoma.  He was treated with androgen deprivation therapy plus primary external-beam radiotherapy targeting the prostate gland.  He did well with androgen deprivation, but subsequently developed metastatic castration-resistant prostate cancer in 1/2021.  At that time, he had a left supraclavicular lymph node that was enlarging despite a relatively low PSA level (0.7 mg/mL).  He underwent a biopsy of that malignant lymph node, which confirmed prostatic adenocarcinoma without neuroendocrine or small cell features.  Somatic genomic analysis from that biopsy revealed combined mutations in TP53 and PTEN.      He was then started on abiraterone plus prednisone in 1/2021, which he took for about one year until 12/2021.  By that time his PSA level was approximately 1.3 ng/mL, and he remained asymptomatic.  Thus, we decided to stop abiraterone and treat him with bipolar androgen therapy (BAT), starting in 1/2022.  He received 10 doses of BAT, and his meaghan PSA level was 0.58 ng/mL.  He was then treated with enzalutamide, starting on 11/28/2022.   Most recently, he began 177Lu-PSMA-I&T treatment on the SPLASH trial,  beginning on 6/20/2023.  He remains on that study to date, and his PSA level is dropping.  The patient presents today to discuss treatment options moving forward.  He is accompanied by his wife.  He wishes to be as aggressive as possible with the management of his metastatic castration-resistant prostate cancer.     Review of Systems:  The patient reports feeling generally well.  However, he has occasional and vertigo from time to time.  Other than that, he does not report any other significant signs or symptoms at this time.  He has not had any weight loss or weight gain.  He does not have any cancer-related pain.  A comprehensive 14-system review of symptoms is otherwise generally within normal limits.  His ECOG score is 0.  His pain score is 0/10.     Past Medical History:  Prostate cancer, metastatic, castration-resistant  Hypercholesterolemia  Vertigo (semicircular canal dehiscence)  Vitamin D deficiency     Medications:  Leuprolide 22.5 mg every 3 months (previous, 9/28/2023)  Lu177-PSMA-I&T, on the SPLASH trial  Tamsulosin 0.4 mg  Melatonin 10 mg  Calcium and vitamin D  Multivitamin tablet  Omega-3 fatty acids  Pomegranate extract     Allergies:  No known drug allergies      Physical Examination:  Mr. Hernandez is a 73-year-old man who appears younger than his age.  His vital signs are unremarkable.  His HEENT examination is within normal limits.  There is no palpable lymphadenopathy.  The cardiac, pulmonary, and gastrointestinal examinations are generally within normal limits.  The neuromuscular evaluation is intact.  The extremities do not demonstrate pitting edema.  The skin evaluation is unremarkable.     Laboratories:  PSA (6/20/2023) - 1.04 ng/mL  --->  Started 177Lu-PSMA-I&T (SPLASH trial)  PSA (7/6/2023) - 0.77 ng/mL  PSA (9/28/2023) - 0.38 ng/mL  PSA (10/4/2023) - 0.44 ng/mL         ASSESSMENT AND PLAN:  Mr. Hernandez is a 73-year-old man with [place] 4+5=9 metastatic (node-predominant)  castration-resistant prostate cancer with combined TP53 and PTEN mutations, who previously developing serologic and radiographic progression despite abiraterone plus prednisone.  He then received BAT treatment, followed by enzalutamide.  As of 6/20/2023, he is participating on the SPLASH trial and is receiving 177Lu-PSMA-I&T radioligand therapy.  His ECOG score is 0.  His pain score is 0/10.     It was a pleasure seeing this patient in follow up today.  He has already received a number of prior systemic therapies for his advanced prostate cancer.  These include androgen deprivation, abiraterone/prednisone, bipolar androgen therapy, and most recently enzalutamide.  On 6/20/2023, he enrolled on the SPLASH clinical trial and he is currently receiving treatment with 177Lu-PSMA-I&T therapy.  Thus far, he is experiencing a favorable treatment response as evidenced by a decline in his PSA level from 1.04 ng/mL down to 0.38 ng/mL.  He will continue to receive additional doses of this radioligand treatment moving forward, as long as he is tolerating it (at least two more doses).  Although his PSA level today is 0.44 ng/mL, I do not necessarily think that this indicates that the 177Lu-PSMA-I&T therapy is no longer working.    While we are delighted that he is responding well to his current therapy, we also took some time to plan for future treatments if needed.  It should be remembered that the patient has not previously received a taxane chemotherapy such as docetaxel or cabazitaxel.  In addition, he might be eligible for an alternative radiopharmaceutical treatment such as radium-223 if he has multiple bone metastases.  There are also a number of clinical trials here at the [facility] that he could participate in.  For example, we have the SU study which is a randomized trial of docetaxel used alone or in combination with radium-223.  We also have the TAMARACK study utilizing MGC-018 which is an antibody-drug  conjugate targeting B7-H3.  Finally, we have the [id number] study, utilizing a PSMA-targeting T-cell engager agent.  There might also be additional clinical trials available at [facility] or [facility] as well.     The patient and his wife were given the opportunity to ask multiple questions today, all of which were answered to their satisfaction.  A total of 40 minutes were spent on this patient on the day of the encounter, of which more than 50% of this time was used for counseling and coordination of care.     Darnell Steve M.D.

## 2023-10-04 NOTE — NURSING NOTE
"Oncology Rooming Note    October 4, 2023 9:20 AM   Jamaal Hernandez is a 73 year old male who presents for:    Chief Complaint   Patient presents with    Blood Draw     Labs drawn from  by RN in lab. VS taken.    Oncology Clinic Visit     [facility] RETURN - PROSTATE CANCER     Initial Vitals: /71   Pulse 59   Temp 97.5  F (36.4  C) (Oral)   Resp 16   Ht 1.753 m (5' 9.02\")   Wt 104.2 kg (229 lb 12.8 oz)   SpO2 100%   BMI 33.92 kg/m   Estimated body mass index is 33.92 kg/m  as calculated from the following:    Height as of this encounter: 1.753 m (5' 9.02\").    Weight as of this encounter: 104.2 kg (229 lb 12.8 oz). Body surface area is 2.25 meters squared.  No Pain (0) Comment: Data Unavailable   No LMP for male patient.  Allergies reviewed: Yes  Medications reviewed: Yes    Medications: Medication refills not needed today.  Pharmacy name entered into [facility]:    [id number] - [place], [place] - [address]  [facility] - [facility], [place] - [address]  [id number] [place], [place] - [address] [facility] [address]    Alec Leahy LPN              "

## 2023-10-04 NOTE — NURSING NOTE
Chief Complaint   Patient presents with    Blood Draw     Labs drawn from  by RN in lab. VS taken.     Labs collected from venipuncture by RN. Vitals taken. Checked in for appointment(s).  Jarvis Gant RN

## 2024-03-09 ENCOUNTER — HEALTH MAINTENANCE LETTER (OUTPATIENT)
Age: 74
End: 2024-03-09

## 2025-03-16 ENCOUNTER — HEALTH MAINTENANCE LETTER (OUTPATIENT)
Age: 75
End: 2025-03-16